# Patient Record
Sex: MALE | Race: BLACK OR AFRICAN AMERICAN | NOT HISPANIC OR LATINO | Employment: OTHER | URBAN - METROPOLITAN AREA
[De-identification: names, ages, dates, MRNs, and addresses within clinical notes are randomized per-mention and may not be internally consistent; named-entity substitution may affect disease eponyms.]

---

## 2018-01-01 ENCOUNTER — HOSPITAL ENCOUNTER (EMERGENCY)
Facility: OTHER | Age: 78
End: 2018-07-24
Attending: EMERGENCY MEDICINE
Payer: MEDICARE

## 2018-01-01 VITALS
HEART RATE: 100 BPM | SYSTOLIC BLOOD PRESSURE: 117 MMHG | WEIGHT: 140 LBS | OXYGEN SATURATION: 100 % | DIASTOLIC BLOOD PRESSURE: 60 MMHG | RESPIRATION RATE: 36 BRPM | TEMPERATURE: 97 F

## 2018-01-01 DIAGNOSIS — R41.82 ALTERED MENTAL STATUS: ICD-10-CM

## 2018-01-01 DIAGNOSIS — R06.82 TACHYPNEA: ICD-10-CM

## 2018-01-01 DIAGNOSIS — I46.9 CARDIAC ARREST: Primary | ICD-10-CM

## 2018-01-01 DIAGNOSIS — R06.02 SOB (SHORTNESS OF BREATH): ICD-10-CM

## 2018-01-01 DIAGNOSIS — R79.89 ELEVATED TROPONIN: ICD-10-CM

## 2018-01-01 LAB
ALBUMIN SERPL BCP-MCNC: 2.9 G/DL
ALLENS TEST: ABNORMAL
ALP SERPL-CCNC: 73 U/L
ALT SERPL W/O P-5'-P-CCNC: 17 U/L
AMMONIA PLAS-SCNC: 37 UMOL/L
AMORPH CRY URNS QL MICRO: ABNORMAL
ANION GAP SERPL CALC-SCNC: 25 MMOL/L
ANISOCYTOSIS BLD QL SMEAR: SLIGHT
AST SERPL-CCNC: 23 U/L
B-OH-BUTYR BLD STRIP-SCNC: 0.9 MMOL/L
BACTERIA #/AREA URNS HPF: ABNORMAL /HPF
BASOPHILS # BLD AUTO: ABNORMAL K/UL
BASOPHILS NFR BLD: 0 %
BILIRUB SERPL-MCNC: 0.5 MG/DL
BILIRUB UR QL STRIP: NEGATIVE
BUN SERPL-MCNC: 14 MG/DL
CALCIUM SERPL-MCNC: 10.1 MG/DL
CHLORIDE SERPL-SCNC: 99 MMOL/L
CLARITY UR: CLEAR
CO2 SERPL-SCNC: 8 MMOL/L
COLOR UR: YELLOW
CREAT SERPL-MCNC: 1.3 MG/DL
DELSYS: ABNORMAL
DIFFERENTIAL METHOD: ABNORMAL
EOSINOPHIL # BLD AUTO: ABNORMAL K/UL
EOSINOPHIL NFR BLD: 0 %
ERYTHROCYTE [DISTWIDTH] IN BLOOD BY AUTOMATED COUNT: 19 %
EST. GFR  (AFRICAN AMERICAN): >60 ML/MIN/1.73 M^2
EST. GFR  (NON AFRICAN AMERICAN): 53 ML/MIN/1.73 M^2
GIANT PLATELETS BLD QL SMEAR: PRESENT
GLUCOSE SERPL-MCNC: 180 MG/DL
GLUCOSE UR QL STRIP: NEGATIVE
HCO3 UR-SCNC: 6.6 MMOL/L (ref 24–28)
HCT VFR BLD AUTO: 22.3 %
HGB BLD-MCNC: 6.2 G/DL
HGB UR QL STRIP: ABNORMAL
HYALINE CASTS #/AREA URNS LPF: 1 /LPF
HYPOCHROMIA BLD QL SMEAR: ABNORMAL
INR PPP: 1.1
KETONES UR QL STRIP: NEGATIVE
LACTATE SERPL-SCNC: >12 MMOL/L
LACTATE SERPL-SCNC: >12 MMOL/L
LEUKOCYTE ESTERASE UR QL STRIP: NEGATIVE
LYMPHOCYTES # BLD AUTO: ABNORMAL K/UL
LYMPHOCYTES NFR BLD: 16 %
MAGNESIUM SERPL-MCNC: 2.3 MG/DL
MCH RBC QN AUTO: 18.8 PG
MCHC RBC AUTO-ENTMCNC: 27.8 G/DL
MCV RBC AUTO: 68 FL
METAMYELOCYTES NFR BLD MANUAL: 2 %
MICROSCOPIC COMMENT: ABNORMAL
MONOCYTES # BLD AUTO: ABNORMAL K/UL
MONOCYTES NFR BLD: 25 %
NEUTROPHILS NFR BLD: 53 %
NEUTS BAND NFR BLD MANUAL: 4 %
NITRITE UR QL STRIP: NEGATIVE
NRBC BLD-RTO: 6 /100 WBC
OVALOCYTES BLD QL SMEAR: ABNORMAL
PCO2 BLDA: 26.4 MMHG (ref 35–45)
PH SMN: 7 [PH] (ref 7.35–7.45)
PH UR STRIP: 6 [PH] (ref 5–8)
PLATELET # BLD AUTO: 285 K/UL
PLATELET BLD QL SMEAR: ABNORMAL
PMV BLD AUTO: 10.3 FL
PO2 BLDA: 28 MMHG (ref 40–60)
POC BE: -25 MMOL/L
POC SATURATED O2: 30 % (ref 95–100)
POIKILOCYTOSIS BLD QL SMEAR: SLIGHT
POLYCHROMASIA BLD QL SMEAR: ABNORMAL
POTASSIUM SERPL-SCNC: 4.7 MMOL/L
PROT SERPL-MCNC: 8.9 G/DL
PROT UR QL STRIP: ABNORMAL
PROTHROMBIN TIME: 12.7 SEC
RBC # BLD AUTO: 3.3 M/UL
RBC #/AREA URNS HPF: 15 /HPF (ref 0–4)
SAMPLE: ABNORMAL
SITE: ABNORMAL
SODIUM SERPL-SCNC: 132 MMOL/L
SP GR UR STRIP: 1.02 (ref 1–1.03)
SPHEROCYTES BLD QL SMEAR: ABNORMAL
SQUAMOUS #/AREA URNS HPF: 3 /HPF
T4 FREE SERPL-MCNC: 1.22 NG/DL
TARGETS BLD QL SMEAR: ABNORMAL
TROPONIN I SERPL DL<=0.01 NG/ML-MCNC: 1.09 NG/ML
TSH SERPL DL<=0.005 MIU/L-ACNC: 4.49 UIU/ML
URN SPEC COLLECT METH UR: ABNORMAL
UROBILINOGEN UR STRIP-ACNC: NEGATIVE EU/DL
WBC # BLD AUTO: 12.15 K/UL
WBC #/AREA URNS HPF: 1 /HPF (ref 0–5)

## 2018-01-01 PROCEDURE — 99291 CRITICAL CARE FIRST HOUR: CPT | Mod: 25

## 2018-01-01 PROCEDURE — 85610 PROTHROMBIN TIME: CPT

## 2018-01-01 PROCEDURE — 93010 ELECTROCARDIOGRAM REPORT: CPT | Mod: 76,,, | Performed by: INTERNAL MEDICINE

## 2018-01-01 PROCEDURE — 85007 BL SMEAR W/DIFF WBC COUNT: CPT

## 2018-01-01 PROCEDURE — 36415 COLL VENOUS BLD VENIPUNCTURE: CPT

## 2018-01-01 PROCEDURE — 83735 ASSAY OF MAGNESIUM: CPT

## 2018-01-01 PROCEDURE — 92950 HEART/LUNG RESUSCITATION CPR: CPT

## 2018-01-01 PROCEDURE — 84439 ASSAY OF FREE THYROXINE: CPT

## 2018-01-01 PROCEDURE — 82803 BLOOD GASES ANY COMBINATION: CPT

## 2018-01-01 PROCEDURE — 63600175 PHARM REV CODE 636 W HCPCS: Performed by: EMERGENCY MEDICINE

## 2018-01-01 PROCEDURE — 93010 ELECTROCARDIOGRAM REPORT: CPT | Mod: ,,, | Performed by: INTERNAL MEDICINE

## 2018-01-01 PROCEDURE — 96374 THER/PROPH/DIAG INJ IV PUSH: CPT | Mod: 59

## 2018-01-01 PROCEDURE — 25000003 PHARM REV CODE 250: Performed by: EMERGENCY MEDICINE

## 2018-01-01 PROCEDURE — 82010 KETONE BODYS QUAN: CPT

## 2018-01-01 PROCEDURE — 31500 INSERT EMERGENCY AIRWAY: CPT

## 2018-01-01 PROCEDURE — 80053 COMPREHEN METABOLIC PANEL: CPT

## 2018-01-01 PROCEDURE — 96375 TX/PRO/DX INJ NEW DRUG ADDON: CPT

## 2018-01-01 PROCEDURE — 84443 ASSAY THYROID STIM HORMONE: CPT

## 2018-01-01 PROCEDURE — 82140 ASSAY OF AMMONIA: CPT

## 2018-01-01 PROCEDURE — 81000 URINALYSIS NONAUTO W/SCOPE: CPT

## 2018-01-01 PROCEDURE — 83605 ASSAY OF LACTIC ACID: CPT | Mod: 91

## 2018-01-01 PROCEDURE — 85027 COMPLETE CBC AUTOMATED: CPT

## 2018-01-01 PROCEDURE — 96361 HYDRATE IV INFUSION ADD-ON: CPT | Mod: 59

## 2018-01-01 PROCEDURE — 84484 ASSAY OF TROPONIN QUANT: CPT

## 2018-01-01 RX ORDER — EPINEPHRINE 0.1 MG/ML
INJECTION INTRAVENOUS CODE/TRAUMA/SEDATION MEDICATION
Status: COMPLETED | OUTPATIENT
Start: 2018-01-01 | End: 2018-01-01

## 2018-01-01 RX ORDER — SODIUM BICARBONATE 1 MEQ/ML
SYRINGE (ML) INTRAVENOUS CODE/TRAUMA/SEDATION MEDICATION
Status: COMPLETED | OUTPATIENT
Start: 2018-01-01 | End: 2018-01-01

## 2018-01-01 RX ORDER — AMIODARONE HYDROCHLORIDE 150 MG/3ML
INJECTION, SOLUTION INTRAVENOUS CODE/TRAUMA/SEDATION MEDICATION
Status: COMPLETED | OUTPATIENT
Start: 2018-01-01 | End: 2018-01-01

## 2018-01-01 RX ORDER — ASPIRIN 325 MG
325 TABLET, DELAYED RELEASE (ENTERIC COATED) ORAL
Status: COMPLETED | OUTPATIENT
Start: 2018-01-01 | End: 2018-01-01

## 2018-01-01 RX ORDER — ONDANSETRON 2 MG/ML
4 INJECTION INTRAMUSCULAR; INTRAVENOUS
Status: COMPLETED | OUTPATIENT
Start: 2018-01-01 | End: 2018-01-01

## 2018-01-01 RX ORDER — DEXAMETHASONE SODIUM PHOSPHATE 4 MG/ML
10 INJECTION, SOLUTION INTRA-ARTICULAR; INTRALESIONAL; INTRAMUSCULAR; INTRAVENOUS; SOFT TISSUE
Status: COMPLETED | OUTPATIENT
Start: 2018-01-01 | End: 2018-01-01

## 2018-01-01 RX ADMIN — SODIUM CHLORIDE 1000 ML: 0.9 INJECTION, SOLUTION INTRAVENOUS at 08:07

## 2018-01-01 RX ADMIN — EPINEPHRINE 1 MG: 0.1 INJECTION, SOLUTION ENDOTRACHEAL; INTRACARDIAC; INTRAVENOUS at 01:07

## 2018-01-01 RX ADMIN — SODIUM BICARBONATE 50 MEQ: 84 INJECTION, SOLUTION INTRAVENOUS at 01:07

## 2018-01-01 RX ADMIN — SODIUM BICARBONATE 50 MEQ: 84 INJECTION, SOLUTION INTRAVENOUS at 12:07

## 2018-01-01 RX ADMIN — EPINEPHRINE 1 MG: 0.1 INJECTION, SOLUTION ENDOTRACHEAL; INTRACARDIAC; INTRAVENOUS at 12:07

## 2018-01-01 RX ADMIN — AMIODARONE HYDROCHLORIDE 300 MG: 50 INJECTION, SOLUTION INTRAVENOUS at 01:07

## 2018-01-01 RX ADMIN — SODIUM CHLORIDE, SODIUM LACTATE, POTASSIUM CHLORIDE, AND CALCIUM CHLORIDE 1000 ML: .6; .31; .03; .02 INJECTION, SOLUTION INTRAVENOUS at 11:07

## 2018-01-01 RX ADMIN — DEXAMETHASONE SODIUM PHOSPHATE 10 MG: 4 INJECTION, SOLUTION INTRAMUSCULAR; INTRAVENOUS at 10:07

## 2018-01-01 RX ADMIN — AMIODARONE HYDROCHLORIDE 150 MG: 50 INJECTION, SOLUTION INTRAVENOUS at 01:07

## 2018-01-01 RX ADMIN — ASPIRIN 325 MG: 325 TABLET, DELAYED RELEASE ORAL at 10:07

## 2018-01-01 RX ADMIN — ONDANSETRON HYDROCHLORIDE 4 MG: 2 INJECTION, SOLUTION INTRAMUSCULAR; INTRAVENOUS at 10:07

## 2018-07-24 NOTE — ED NOTES
Pt hourly rounding complete. Pt resting in stretcher HOB at 90 degrees. Pt in NAD at this time, Bp and SPO2 WNL, pt is tachycardic. On 4 liters O2. Pt remains on continuous cardiac, BP, and pulse ox monitors. Bed locked and in lowest position, side rails up x2, call light within reach. Will continue to monitor.

## 2018-07-24 NOTE — SIGNIFICANT EVENT
Called to MRI to evaluate pt. A rapid response had already been called. When I arrived, the patient had been removed from the MRI unit. He was unresponsive, pulseless and not breathing. CPR started immediately and code blue called. Rapid response/code team arrived within 1-2 minutes.     Cristóbal Amor MD  Staff IR

## 2018-07-24 NOTE — ED NOTES
Resp unable to obtain ABG. Dr. Nelson made aware. LR infusing to right midline. Pt remains tachypnic, BBS remain diminished with mild expiratory breath sounds.

## 2018-07-24 NOTE — ED PROVIDER NOTES
Encounter Date: 7/24/2018    SCRIBE #1 NOTE: I, Ghanshyam Marquez, am scribing for, and in the presence of, Dr. Lopez.       History     Chief Complaint   Patient presents with    Fatigue     x1 day w/ increased confusion, brought in by RYANN, sinus tach @ 130     Time seen by provider: 8:18 AM    This is a 77 y.o. male who presents with complaint of generalized weakness that began several months ago and worsened in the past few days. The patient's wife is primary historian and reports that he was an otherwise healthy person approximately one year ago. 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 She reports at that time he had no one sided weakness or other complaints, and did not get evaluated by a doctor. The patient hasn't been seen by a doctor in over twenty two years. Since the episode last year, he has had progressive decline in his appetite and functional status. This morning the patient was woke up trying to use the bathroom when he almost fell twice due to generalized weakness. He has barely eaten anything this week. The patient denies headache chest pain, and abdominal pain, though he is poor historian. The patient reports he is experiencing some shortness of breath today. No fevers, cough, congestion, emesis, or other new complaints      The history is provided by the patient and the spouse. The history is limited by the condition of the patient.     Review of patient's allergies indicates:  Allergies not on file  Past Medical History:   Diagnosis Date    Stroke     2017     History reviewed. No pertinent surgical history.  History reviewed. No pertinent family history.  Social History   Substance Use Topics    Smoking status: Former Smoker    Smokeless tobacco: Never Used    Alcohol use No     Review of Systems   Unable to perform ROS: Acuity of condition   Constitutional: Negative for fever.   HENT: Negative for sore throat.    Respiratory:  Positive for shortness of breath.    Cardiovascular: Negative for chest pain.   Gastrointestinal: Negative for abdominal pain and nausea.   Genitourinary: Negative for dysuria.   Musculoskeletal: Negative for back pain.   Skin: Negative for rash.   Neurological: Positive for weakness. Negative for headaches.   Hematological: Does not bruise/bleed easily.       Physical Exam     Initial Vitals   BP Pulse Resp Temp SpO2   07/24/18 0744 07/24/18 0744 07/24/18 0744 07/24/18 0744 07/24/18 0844   129/74 (!) 130 (!) 36 97.9 °F (36.6 °C) 100 %      MAP       --                Physical Exam    Constitutional: He appears well-developed and well-nourished.   Cachectic.   HENT:   Head: Normocephalic and atraumatic.   Mucous membranes are dry.     Eyes:   Left eye with opacity over cornea, unable to assess pupils. No conjunctival erythema.   Neck: Normal range of motion. Neck supple.   Cardiovascular: Regular rhythm, normal heart sounds and normal pulses. Tachycardia present.    No murmur heard.  Pulmonary/Chest: He has no wheezes. He has no rhonchi. He has rales.   Right lung base rales.   Abdominal: Soft. There is no tenderness. There is no rebound and no guarding.   Musculoskeletal: He exhibits edema.   1+ chronic extremity edema.   Neurological: He is alert and oriented to person, place, and time. He has normal strength. No cranial nerve deficit.   Skin: Skin is warm and dry.   Psychiatric: He has a normal mood and affect. His behavior is normal. Thought content normal.         ED Course   Intubation  Date/Time: 7/24/2018 1:36 PM  Performed by: ANN-MARIE SINHA  Authorized by: ANN-MARIE SINHA   Consent Done: Emergent Situation  Indications: respiratory failure  Intubation method: direct  Patient status: unconscious  Preoxygenation: BVM  Laryngoscope size: Mac 3  Tube size: 7.5 mm  Tube type: cuffed  Number of attempts: 1  Cricoid pressure: yes  Cords visualized: yes  Post-procedure assessment: CO2 detector and  chest rise  Breath sounds: rales/crackles  Cuff inflated: yes  ETT to lip: 23 cm  Tube secured with: ETT harley  Complications: No        Labs Reviewed   COMPREHENSIVE METABOLIC PANEL - Abnormal; Notable for the following:        Result Value    Sodium 132 (*)     CO2 8 (*)     Glucose 180 (*)     Total Protein 8.9 (*)     Albumin 2.9 (*)     Anion Gap 25 (*)     eGFR if non  53 (*)     All other components within normal limits    Narrative:     Carbon Dioxide   critical result(s) called and verbal readback   obtained from  CISCO Rodarte, 07/24/2018 09:11   CBC W/ AUTO DIFFERENTIAL - Abnormal; Notable for the following:     RBC 3.30 (*)     Hemoglobin 6.2 (*)     Hematocrit 22.3 (*)     MCV 68 (*)     MCH 18.8 (*)     MCHC 27.8 (*)     RDW 19.0 (*)     nRBC 6 (*)     Lymph% 16.0 (*)     Mono% 25.0 (*)     All other components within normal limits   PROTIME-INR - Abnormal; Notable for the following:     Prothrombin Time 12.7 (*)     All other components within normal limits   TROPONIN I - Abnormal; Notable for the following:     Troponin I 1.095 (*)     All other components within normal limits   TSH - Abnormal; Notable for the following:     TSH 4.492 (*)     All other components within normal limits   URINALYSIS, REFLEX TO URINE CULTURE - Abnormal; Notable for the following:     Protein, UA 1+ (*)     Occult Blood UA 2+ (*)     All other components within normal limits    Narrative:     Preferred Collection Type->Urine, Clean Catch   BETA - HYDROXYBUTYRATE, SERUM - Abnormal; Notable for the following:     Beta-Hydroxybutyrate 0.9 (*)     All other components within normal limits   LACTIC ACID, PLASMA - Abnormal; Notable for the following:     Lactate (Lactic Acid) >12.0 (*)     All other components within normal limits    Narrative:     Lactic Acid   critical result(s) called and verbal readback obtained   from Sharon Andrew, 07/24/2018 10:33   LACTIC ACID, PLASMA - Abnormal; Notable for the following:      Lactate (Lactic Acid) >12.0 (*)     All other components within normal limits    Narrative:     lactic acid   critical result(s) called and verbal readback obtained   from   JAYLEN saunders, 07/24/2018 13:01   URINALYSIS MICROSCOPIC - Abnormal; Notable for the following:     RBC, UA 15 (*)     Bacteria, UA Few (*)     All other components within normal limits    Narrative:     Preferred Collection Type->Urine, Clean Catch   ISTAT PROCEDURE - Abnormal; Notable for the following:     POC PH 7.004 (*)     POC PCO2 26.4 (*)     POC PO2 28 (*)     POC HCO3 6.6 (*)     POC SATURATED O2 30 (*)     All other components within normal limits   MAGNESIUM   AMMONIA   T4, FREE     EKG Readings: (Independently Interpreted)   Sinus tachycardia at a rate of 129 bpm. ST depressions laterally. No previous.       Imaging Results          MRI Brain Without Contrast (Final result)  Result time 07/24/18 14:33:48   Procedure changed from MRI Brain W WO Contrast     Final result by Cristóbal Pat MD (07/24/18 14:33:48)                 Impression:      Limited study as above, terminated prior to completion.    Large lobulated extra-axial hemorrhagic mass/collection in the anterior left middle cranial fossa as well as the anterolateral aspect of the posterior fossa as above.  There is extensive associated vasogenic edema within the left cerebral hemisphere particularly affecting temporal lobe.    The mass or collection likely arising from the skull base or sphenoid sinus, concerning for an invasive fungal sinusitis.  Malignancy possible but thought less likely.      Electronically signed by: Cristóbal Pat MD  Date:    07/24/2018  Time:    14:33             Narrative:    EXAMINATION:  MRI BRAIN WITHOUT CONTRAST    CLINICAL HISTORY:  Confusion/delirium, altered LOC, unexplained;Altered mental status, unspecified    TECHNIQUE:  Multiplanar multisequence MR imaging of the brain was attempted.  Examination terminated prior to completion due to  patient condition.  Sagittal T1, axial T2*, axial DWI sequence is obtained.  No postcontrast imaging performed.    COMPARISON:  Head CT performed earlier on the same date    FINDINGS:  Ventricles are stable in size, without evidence of hydrocephalus.    Large multilobulated extra-axial mass or collection centered within the anterior inferior aspect of the left middle cranial fossa, also protruding into the left anterolateral aspect of the posterior fossa.  The posterior fossa portion of the lesion mildly indents the left ventral aspect of the celia but without significant distortion or edema at this site.  However, there is mass effect and crowding within the left middle cranial fossa with extensive vasogenic edema throughout the left temporal lobe.  The posterior fossa portion of the lesion measures on the order of 2 cm maximally, the middle cranial fossa portion of the lesion measuring up to 3.8 cm in diameter.  Most of this collection or mass exhibits facilitated diffusion.  There is periphery of susceptibility artifacts as well as some patchy T1 hyperintensity suggesting blood products within it.  Lesion thought likely arising from the skull base or sphenoid sinus.  Abnormal marrow signal intensity within the clivus.  There is lobulated T1 hypointensity within the sphenoid sinus protruding into posterior nasal cavity.  There is also lobulated opacification in the right frontal sinus, right maxillary sinus and anterior ethmoid air cells.    No evidence of acute infarction.  Mild chronic microvascular ischemic changes.                               CT Head Without Contrast (Final result)     Abnormal  Result time 07/24/18 09:18:43    Final result by Cristóbal Pat MD (07/24/18 09:18:43)                 Impression:      Large region of vasogenic edema involving the left temporal lobe with mass effect in the middle cranial fossa with probable underlying mass lesion extending along the left tentorial leaflet at the  level of the incisura.  Recommend contrast enhanced MR for further characterization.    Moderate chronic microvascular ischemic change.    Moderate patchy opacification throughout the paranasal sinuses suggesting chronic sinusitis with probable superimposed sinonasal polyposis.    This report was flagged in Epic as abnormal.      Electronically signed by: Cristóbal Pat MD  Date:    07/24/2018  Time:    09:18             Narrative:    EXAMINATION:  CT HEAD WITHOUT CONTRAST    CLINICAL HISTORY:  Confusion/delirium, altered LOC, unexplained;    TECHNIQUE:  Low dose axial CT images obtained throughout the head without the use of intravenous contrast.  Axial, sagittal and coronal reconstructions were performed.  Examination mildly degraded by patient motion artifact.    COMPARISON:  None.    FINDINGS:  Intracranial compartment:    Ventricles and sulci are normal in size for age without evidence of hydrocephalus.    There is a large region of vasogenic edema centered in the left temporal lobe.  There is associated mass effect and crowding with effacement of sulci in this region.    Moderate chronic microvascular ischemic changes throughout the supratentorial white matter.  No recent or remote major vascular distribution infarct.    No extra-axial blood.  There appears to be an extra-axial mass in the medial aspect of the left middle cranial fossa protruding into the posterior fossa over the tentorial incisura with some indentation of the celia.  Lesion could reflect a meningioma.  Lower attenuation extra-axial focus suggested in the anterior aspect of the left middle cranial fossa, possibly superimposed arachnoid cyst or other fluid collection.    Skull/extracranial contents (limited evaluation):    No fracture. Mastoid air cells and paranasal sinuses are essentially clear.    COMMUNICATION  This result was discovered/received at 9:10 a.m..  The critical information above was relayed directly by me by telephone to the  emergency room physician (Jessica) on 07/24/2018 at 9:16 a.m..                               X-Ray Chest AP Portable (Final result)  Result time 07/24/18 09:18:11    Final result by Ariana Fnoseca MD (07/24/18 09:18:11)                 Impression:      As above      Electronically signed by: Ariana Fonseca MD  Date:    07/24/2018  Time:    09:18             Narrative:    EXAMINATION:  XR CHEST AP PORTABLE    CLINICAL HISTORY:  Tachypnea, not elsewhere classified    TECHNIQUE:  Single frontal view of the chest was performed.    COMPARISON:  None    FINDINGS:  The cardiac silhouette is normal in size.  Mild increased attenuation both lung fields, nonspecific, mild increased pulmonary venous pressure could have this appearance.  No focal airspace disease.  No pleural effusion.  No pneumothorax.  There is atherosclerotic changes of the aorta.  The osseous structures appear normal.                                 Medical Decision Making:   Initial Assessment:       77M brought by wife due to worsening generalized weakness and poor PO intake x 1 year, came to ED today because he became so weak he was unable to walk. Wife provides much of history and states he was healthy until about a year ago when he suddenly lost vision in his L eye, and since when with progressive failure to thrive and weakness. On arrival he is somewhat confused but alert, denies any complaints. He does have some tachypnea on arrival and R base rales, normal O2sat. EKG is concerning for lateral ST depressions, no previous to compare, but no CP currently or known CAD hx, though he hasnt seen a doctor in over 20 years. No focal weakness to suggest CVA.    Initial labs with multiple concerning abnormalities:  Ct head with vasogenic edema L temporal area, concern for underlying mass- will need MRI. Discussed with transfer center who discussed with Neurosurgery Dr. Galdamez, who recommended transfer to [facility] for full consult, and MRI  preferably before transfer there.  Severe anemia- Hgb 6.2; hemoccult (-) with brown stool, could be very slow GI bleed, vitamin or iron deficiency, or anemia of chronic disease. Will need transfusion and workup.  Lactic acidosis- No severe ANABELLE, but with anion gap 25, glucose 180 so unlikely to be DKA. No fevers, WBC 12, no PNA on CXR or sign of UTI or clear infectious source. No sign sepsis, likely due to severe dehydration. Aggressively hydrated.  (+) trop 1.1- Repeat EKG concerning for new Q waves, but still no CP. Unclear whether he is having NSTEMI, cardiac stress from anemia, trop leak from brain mass. He was given ASA, but no anti-coagulation due to concern for hemorrhage from mass.     On re-assess, patient with some nausea, but still at same mental status and stable BP, improved tachycardia after IVF. Discussed with Dr. Prince, Neuro ICU, who agrees to accept transfer there after MRI. Prior to MRI, VBG done with pH 7.0, and repeat lactate after IVF resulted still >12.    Rapid response called to MRI, I immediately went there and found patient in cardiac arrest, which happened soon after MRI started. They stated he was initially following commands but then was noted to be apneic and pulseless, CPR immediately started. Multiple rounds Epi and bicarb given, v-fib rhythm on monitor so was shocked and loaded with Amiodarone. Toni device placed, and I intubated patient due to some pinky secretions. ACLS continued for 30 minutes total, with multiple pulse checks with v-fib and defibrillation, additional Epi. Bedside Echo on pulse checks with no cardiac activity. Given severity of lactic acidosis, (+) trop, severe anemia, I suspect that he had cardiac arrest. Time of death called and patient brought back to ED, where family notified with  at bedside.   Radiology later called and said MRI showed possible invasive sinusitis causing large hemorrhagic mass L middle cranial fossa. They state mass was invading  cerebral vessels and could have caused acute hemorrhage, vs cardiac arrest from ACS.    Clinical Tests:   Lab Tests: Ordered and Reviewed  Radiological Study: Ordered and Reviewed            Scribe Attestation:   Scribe #1: I performed the above scribed service and the documentation accurately describes the services I performed. I attest to the accuracy of the note.    Attending Attestation:         Attending Critical Care:   Critical Care Times:   Direct Patient Care (initial evaluation, reassessments, and time considering the case)................................................................33 minutes.   Additional History from reviewing old medical records or taking additional history from the family, EMS, PCP, etc.......................8 minutes.   Ordering, Reviewing, and Interpreting Diagnostic Studies...............................................................................................................7 minutes.   Documentation..................................................................................................................................................................................5 minutes.   Consultation with other Physicians. .................................................................................................................................................7 minutes.   Consultation with the patient's family directly relating to the patient's condition, care, and DNR status (when patient unable)......6 minutes.   ==============================================================  · Total Critical Care Time - exclusive of procedural time: 66 minutes.  ==============================================================  Critical Care Condition: critical   Critical Care Comments: Cardiac arrest, CPR     Physician Attestation for Scribe:  Physician Attestation Statement for Scribe #1: I, Dr. Lopez, reviewed documentation, as scribed by Ghanshyam Marquez in my presence, and  it is both accurate and complete.                    Clinical Impression:     1. Cardiac arrest    2. Tachypnea    3. SOB (shortness of breath)    4. Altered mental status    5. Elevated troponin                                 Brendan Lopez MD  07/24/18 [id number]

## 2018-07-24 NOTE — CHAPLAIN
Met with family offering grief support and spiritual presence, with prayer.  .  Decedent paperwork is complete.  Auberry  released.  STEPHANIE released due to age, [id number], Desi Carnes.  [facility]  .  Brenda Blas  [id number]

## 2018-07-24 NOTE — ED TRIAGE NOTES
Pt presents via EMS with SOB. Per pt's wife and daughter the pt has not seen a doctor in over 22 years, he refuses to go. Per pt wife he had a stroke at home last year and did not seek care. Pt is blind in his left eye, unable to clarify if that was due to the stroke or cataract that is seen, no drainage from eyes. Pt appears lethargic. Skin and mucus membranes are pale and dry. Pt is missing multiple teeth, does not have dentures. No difficulty maintaining secretions. Cap refill delayed, fingers bilaterally are clubbed, nailbeds are pale, hands cool to the touch. Pedal pulses assessed with doppler in right foot and present, unable to assess by hand or with doppler in left foot, cap refill delayed bilaterally. Pt feet are dry and skin flaking off, cool to the touch. Pt is tachy and denies chest pain. Pt on O2 4 liters. Bed locked and in lowest position, HOB elevated to 90 degrees, side rails up x2, call light within reach. Pt remains on continuous bp, pulse ox, and cardiac monitors.

## 2018-07-24 NOTE — ED NOTES
Pt rounding hourly rounding complete. Pt resting in stretcher, daughter at bedside, NAD noted. Pt given ASA orally, was able to swallow water without difficulty and medication. Bed locked and in lowest position, side rails up x2, call light within reach. Pt remains on continuous cardiac, pulse ox, and BP monitors. Will continue to monitor.

## 2018-07-24 NOTE — ED NOTES
Physician at bedside, rectal exam completed. No skin break down noted on pt's backside or buttocks.

## 2018-07-24 NOTE — ED NOTES
Pt tubes and lines removed. Pt identification tag placed on pt's right big toe. Pt wrapped and placed placed in body bag, bag tag in place. Pt's family took belongings. Ready for transport.